# Patient Record
Sex: MALE | Race: AMERICAN INDIAN OR ALASKA NATIVE | NOT HISPANIC OR LATINO
[De-identification: names, ages, dates, MRNs, and addresses within clinical notes are randomized per-mention and may not be internally consistent; named-entity substitution may affect disease eponyms.]

---

## 2017-04-20 PROBLEM — Z00.00 ENCOUNTER FOR PREVENTIVE HEALTH EXAMINATION: Status: ACTIVE | Noted: 2017-04-20

## 2017-05-03 ENCOUNTER — APPOINTMENT (OUTPATIENT)
Dept: BREAST CENTER | Facility: CLINIC | Age: 77
End: 2017-05-03

## 2017-06-30 ENCOUNTER — OUTPATIENT (OUTPATIENT)
Dept: OUTPATIENT SERVICES | Facility: HOSPITAL | Age: 77
LOS: 1 days | Discharge: HOME | End: 2017-06-30

## 2017-06-30 DIAGNOSIS — R91.1 SOLITARY PULMONARY NODULE: ICD-10-CM

## 2017-11-16 ENCOUNTER — EMERGENCY (EMERGENCY)
Facility: HOSPITAL | Age: 77
LOS: 0 days | Discharge: HOME | End: 2017-11-16
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
## 2017-11-16 DIAGNOSIS — S09.90XA UNSPECIFIED INJURY OF HEAD, INITIAL ENCOUNTER: ICD-10-CM
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
## 2017-11-16 DIAGNOSIS — Z90.49 ACQUIRED ABSENCE OF OTHER SPECIFIED PARTS OF DIGESTIVE TRACT: ICD-10-CM

## 2017-11-16 DIAGNOSIS — Z79.899 OTHER LONG TERM (CURRENT) DRUG THERAPY: ICD-10-CM
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
## 2017-11-16 DIAGNOSIS — S00.81XA ABRASION OF OTHER PART OF HEAD, INITIAL ENCOUNTER: ICD-10-CM

## 2017-11-16 DIAGNOSIS — I10 ESSENTIAL (PRIMARY) HYPERTENSION: ICD-10-CM

## 2017-11-16 DIAGNOSIS — Z79.82 LONG TERM (CURRENT) USE OF ASPIRIN: ICD-10-CM

## 2017-11-16 DIAGNOSIS — S00.83XA CONTUSION OF OTHER PART OF HEAD, INITIAL ENCOUNTER: ICD-10-CM

## 2017-11-16 DIAGNOSIS — E78.5 HYPERLIPIDEMIA, UNSPECIFIED: ICD-10-CM

## 2018-12-24 ENCOUNTER — TRANSCRIPTION ENCOUNTER (OUTPATIENT)
Age: 78
End: 2018-12-24

## 2023-08-21 ENCOUNTER — EMERGENCY (EMERGENCY)
Facility: HOSPITAL | Age: 83
LOS: 0 days | Discharge: HOME | End: 2023-08-21
Attending: EMERGENCY MEDICINE
Payer: MEDICARE

## 2023-08-21 VITALS
WEIGHT: 171.96 LBS | HEART RATE: 72 BPM | DIASTOLIC BLOOD PRESSURE: 92 MMHG | TEMPERATURE: 98 F | RESPIRATION RATE: 16 BRPM | HEIGHT: 67 IN | OXYGEN SATURATION: 100 % | SYSTOLIC BLOOD PRESSURE: 189 MMHG

## 2023-08-21 DIAGNOSIS — Z79.01 LONG TERM (CURRENT) USE OF ANTICOAGULANTS: ICD-10-CM

## 2023-08-21 DIAGNOSIS — I48.91 UNSPECIFIED ATRIAL FIBRILLATION: ICD-10-CM

## 2023-08-21 DIAGNOSIS — M54.50 LOW BACK PAIN, UNSPECIFIED: ICD-10-CM

## 2023-08-21 DIAGNOSIS — R91.1 SOLITARY PULMONARY NODULE: ICD-10-CM

## 2023-08-21 DIAGNOSIS — S32.020A WEDGE COMPRESSION FRACTURE OF SECOND LUMBAR VERTEBRA, INITIAL ENCOUNTER FOR CLOSED FRACTURE: ICD-10-CM
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
## 2023-08-21 DIAGNOSIS — E78.5 HYPERLIPIDEMIA, UNSPECIFIED: ICD-10-CM
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
## 2023-08-21 DIAGNOSIS — J32.9 CHRONIC SINUSITIS, UNSPECIFIED: ICD-10-CM

## 2023-08-21 LAB
ALBUMIN SERPL ELPH-MCNC: 4.1 G/DL — SIGNIFICANT CHANGE UP (ref 3.5–5.2)
ALP SERPL-CCNC: 99 U/L — SIGNIFICANT CHANGE UP (ref 30–115)
ALT FLD-CCNC: 22 U/L — SIGNIFICANT CHANGE UP (ref 0–41)
ANION GAP SERPL CALC-SCNC: 10 MMOL/L — SIGNIFICANT CHANGE UP (ref 7–14)
ANISOCYTOSIS BLD QL: SLIGHT — SIGNIFICANT CHANGE UP
AST SERPL-CCNC: 22 U/L — SIGNIFICANT CHANGE UP (ref 0–41)
BASOPHILS # BLD AUTO: 0.05 K/UL — SIGNIFICANT CHANGE UP (ref 0–0.2)
BASOPHILS NFR BLD AUTO: 0.9 % — SIGNIFICANT CHANGE UP (ref 0–1)
BILIRUB SERPL-MCNC: 0.5 MG/DL — SIGNIFICANT CHANGE UP (ref 0.2–1.2)
BUN SERPL-MCNC: 21 MG/DL — HIGH (ref 10–20)
CALCIUM SERPL-MCNC: 9 MG/DL — SIGNIFICANT CHANGE UP (ref 8.4–10.4)
CHLORIDE SERPL-SCNC: 100 MMOL/L — SIGNIFICANT CHANGE UP (ref 98–110)
CO2 SERPL-SCNC: 24 MMOL/L — SIGNIFICANT CHANGE UP (ref 17–32)
CREAT SERPL-MCNC: 1.2 MG/DL — SIGNIFICANT CHANGE UP (ref 0.7–1.5)
EGFR: 60 ML/MIN/1.73M2 — SIGNIFICANT CHANGE UP
EOSINOPHIL # BLD AUTO: 0.37 K/UL — SIGNIFICANT CHANGE UP (ref 0–0.7)
EOSINOPHIL NFR BLD AUTO: 6.1 % — SIGNIFICANT CHANGE UP (ref 0–8)
GLUCOSE SERPL-MCNC: 109 MG/DL — HIGH (ref 70–99)
HCT VFR BLD CALC: 28.5 % — LOW (ref 42–52)
HGB BLD-MCNC: 10 G/DL — LOW (ref 14–18)
LYMPHOCYTES # BLD AUTO: 0.64 K/UL — LOW (ref 1.2–3.4)
LYMPHOCYTES # BLD AUTO: 10.5 % — LOW (ref 20.5–51.1)
MACROCYTES BLD QL: SIGNIFICANT CHANGE UP
MANUAL SMEAR VERIFICATION: SIGNIFICANT CHANGE UP
MCHC RBC-ENTMCNC: 35.1 G/DL — SIGNIFICANT CHANGE UP (ref 32–37)
MCHC RBC-ENTMCNC: 37.5 PG — HIGH (ref 27–31)
MCV RBC AUTO: 106.7 FL — HIGH (ref 80–94)
MONOCYTES # BLD AUTO: 0.64 K/UL — HIGH (ref 0.1–0.6)
MONOCYTES NFR BLD AUTO: 10.5 % — HIGH (ref 1.7–9.3)
NEUTROPHILS # BLD AUTO: 4.38 K/UL — SIGNIFICANT CHANGE UP (ref 1.4–6.5)
NEUTROPHILS NFR BLD AUTO: 71.1 % — SIGNIFICANT CHANGE UP (ref 42.2–75.2)
NEUTS BAND # BLD: 0.9 % — SIGNIFICANT CHANGE UP (ref 0–6)
PLAT MORPH BLD: NORMAL — SIGNIFICANT CHANGE UP
PLATELET # BLD AUTO: 277 K/UL — SIGNIFICANT CHANGE UP (ref 130–400)
PMV BLD: 10.1 FL — SIGNIFICANT CHANGE UP (ref 7.4–10.4)
POIKILOCYTOSIS BLD QL AUTO: SLIGHT — SIGNIFICANT CHANGE UP
POLYCHROMASIA BLD QL SMEAR: SLIGHT — SIGNIFICANT CHANGE UP
POTASSIUM SERPL-MCNC: 4.3 MMOL/L — SIGNIFICANT CHANGE UP (ref 3.5–5)
POTASSIUM SERPL-SCNC: 4.3 MMOL/L — SIGNIFICANT CHANGE UP (ref 3.5–5)
PROT SERPL-MCNC: 6.3 G/DL — SIGNIFICANT CHANGE UP (ref 6–8)
RBC # BLD: 2.67 M/UL — LOW (ref 4.7–6.1)
RBC # FLD: 13.7 % — SIGNIFICANT CHANGE UP (ref 11.5–14.5)
RBC BLD AUTO: ABNORMAL
SODIUM SERPL-SCNC: 134 MMOL/L — LOW (ref 135–146)
TROPONIN T SERPL-MCNC: <0.01 NG/ML — SIGNIFICANT CHANGE UP
WBC # BLD: 6.08 K/UL — SIGNIFICANT CHANGE UP (ref 4.8–10.8)
WBC # FLD AUTO: 6.08 K/UL — SIGNIFICANT CHANGE UP (ref 4.8–10.8)

## 2023-08-21 PROCEDURE — 70450 CT HEAD/BRAIN W/O DYE: CPT | Mod: 26,MA

## 2023-08-21 PROCEDURE — 72170 X-RAY EXAM OF PELVIS: CPT | Mod: 26

## 2023-08-21 PROCEDURE — 93005 ELECTROCARDIOGRAM TRACING: CPT

## 2023-08-21 PROCEDURE — 74177 CT ABD & PELVIS W/CONTRAST: CPT | Mod: MA

## 2023-08-21 PROCEDURE — 71045 X-RAY EXAM CHEST 1 VIEW: CPT

## 2023-08-21 PROCEDURE — 71260 CT THORAX DX C+: CPT | Mod: 26,MA

## 2023-08-21 PROCEDURE — 71260 CT THORAX DX C+: CPT | Mod: MA

## 2023-08-21 PROCEDURE — 71045 X-RAY EXAM CHEST 1 VIEW: CPT | Mod: 26

## 2023-08-21 PROCEDURE — 74177 CT ABD & PELVIS W/CONTRAST: CPT | Mod: 26,MA

## 2023-08-21 PROCEDURE — 99285 EMERGENCY DEPT VISIT HI MDM: CPT | Mod: 25

## 2023-08-21 PROCEDURE — 36415 COLL VENOUS BLD VENIPUNCTURE: CPT

## 2023-08-21 PROCEDURE — 70450 CT HEAD/BRAIN W/O DYE: CPT | Mod: MA

## 2023-08-21 PROCEDURE — 80053 COMPREHEN METABOLIC PANEL: CPT

## 2023-08-21 PROCEDURE — 85025 COMPLETE CBC W/AUTO DIFF WBC: CPT

## 2023-08-21 PROCEDURE — 84484 ASSAY OF TROPONIN QUANT: CPT

## 2023-08-21 PROCEDURE — 93010 ELECTROCARDIOGRAM REPORT: CPT

## 2023-08-21 PROCEDURE — 72170 X-RAY EXAM OF PELVIS: CPT

## 2023-08-21 PROCEDURE — 99285 EMERGENCY DEPT VISIT HI MDM: CPT

## 2023-08-21 PROCEDURE — 72125 CT NECK SPINE W/O DYE: CPT | Mod: 26,MA

## 2023-08-21 PROCEDURE — 72125 CT NECK SPINE W/O DYE: CPT | Mod: MA

## 2023-08-21 RX ORDER — IBUPROFEN 200 MG
1 TABLET ORAL
Qty: 28 | Refills: 0
Start: 2023-08-21 | End: 2023-08-27

## 2023-08-21 RX ORDER — LIDOCAINE 4 G/100G
1 CREAM TOPICAL ONCE
Refills: 0 | Status: COMPLETED | OUTPATIENT
Start: 2023-08-21 | End: 2023-08-21

## 2023-08-21 RX ORDER — DEXAMETHASONE 0.5 MG/5ML
6 ELIXIR ORAL ONCE
Refills: 0 | Status: COMPLETED | OUTPATIENT
Start: 2023-08-21 | End: 2023-08-21

## 2023-08-21 RX ORDER — LIDOCAINE 4 G/100G
1 CREAM TOPICAL
Qty: 7 | Refills: 0
Start: 2023-08-21 | End: 2023-08-27

## 2023-08-21 RX ORDER — KETOROLAC TROMETHAMINE 30 MG/ML
15 SYRINGE (ML) INJECTION ONCE
Refills: 0 | Status: DISCONTINUED | OUTPATIENT
Start: 2023-08-21 | End: 2023-08-21

## 2023-08-21 RX ADMIN — Medication 6 MILLIGRAM(S): at 18:06

## 2023-08-21 RX ADMIN — LIDOCAINE 1 PATCH: 4 CREAM TOPICAL at 18:06

## 2023-08-21 RX ADMIN — Medication 15 MILLIGRAM(S): at 18:06

## 2023-08-21 NOTE — ED PROVIDER NOTE - CARE PROVIDER_API CALL
Laura Henderson  Neurosurgery  [address], [address]  [place] [address]  Phone: [phone]  Fax: [phone]  Follow Up Time: 7-10 Days    Kalia Gonzalez  Otolaryngology  [address] [place] [address]  Phone: [phone]  Fax: [phone]  Follow Up Time: 7-10 Days

## 2023-08-21 NOTE — ED PROVIDER NOTE - PHYSICAL EXAMINATION
VITAL SIGNS: AFebrile, vital signs stable  CONSTITUTIONAL: Well-developed; well-nourished; in no acute distress.  SKIN: Skin exam is warm and dry, no acute rash.  HEAD: Normocephalic; atraumatic.  EYES: Pupils equal round reactive to light, Extraocular movements intact; conjunctiva and sclera clear.  ENT: No nasal discharge; airway clear. Moist mucus membranes.  NECK: Supple; non tender. No rigidity  CARD: Regular rate and rhythm. Normal S1, S2; no murmurs, gallops, or rubs.  RESP: Lungs clear to auscultation bilaterally. No wheezes, rales or rhonchi.  ABD: Abdomen soft; non-tender; non-distended;  no hepatosplenomegaly. No costovertebral angle tenderness.   EXT: Normal ROM. No clubbing, cyanosis or edema. No calf tenderness to palpation.  NEURO: Alert and Oriented x 3, Cranial nerves 2-12 intact, No nystagmus.  5/5 motor x 4 extremities, Sensation intact to light touch x 4 extremities, No facial droop or slurred speech. No pronator drift.  Normal rapid alternating movement and finger nose finger bilaterally. No midline cervical/thoracic/lumbar tenderness to palpation or step off. Normal gait, No ataxia. Left lumbar back ttp and spasm, no saddle anesthesia, EHL intact bilaterally  PSYCH: Cooperative, appropriate.

## 2023-08-21 NOTE — ED PROVIDER NOTE - CARE PROVIDERS DIRECT ADDRESSES
[email or web address]

## 2023-08-21 NOTE — ED PROVIDER NOTE - NSFOLLOWUPINSTRUCTIONS_ED_ALL_ED_FT
Our Emergency Department Referral Coordinators will be reaching out to you in the next 24-48 hours from 9:00am to 5:00pm with a follow up appointment. Please expect a phone call from the hospital in that time frame. If you do not receive a call or if you have any questions or concerns, you can reach them at   [phone]     Vertebral Compression Fracture    AMBULATORY CARE:    A vertebral compression fracture (VCF) is a collapse or breakdown in a bone in your spine. Compression fractures happen when there is too much pressure on the vertebra. VCFs most often occur in the thoracic (middle) and lumbar (lower) areas of your spine. Fractures may be mild to severe.       Signs and symptoms: You may not have any signs or symptoms with a mild VCF. You may have any of the following with a more severe fracture:  •Sudden, severe, and sharp back pain  •Back pain that gets worse when you stand or walk  •Muscle spasms in your back  •Problems urinating or having bowel movements  •Sudden weakness in your arms or legs  Call your local emergency number (911 in the [place]) if:   •You feel lightheaded, short of breath, and have chest pain.  •You cough up blood.  •You suddenly cannot feel your legs.  •You suddenly have trouble moving your arms or legs.    Seek care immediately if:   •Your arm or leg feels warm, tender, and painful. It may look swollen and red.  •You have new problems urinating or having bowel movements.  •You have severe pain in your back after falling, bending forward, sneezing, or coughing strongly.    Call your doctor or orthopedist if:   •You cannot sleep or rest because of back pain.  •You have pain or swelling in your back that is getting worse, or does not go away.  •You have questions or concerns about your condition or care.    Treatment may include any of the following, depending on how severe the fracture is:   •Bed rest may be needed for a mild fracture.  •A back brace may be needed for 8 to 12 weeks. A brace may decrease your pain, and help your vertebrae heal.  •A cane or walker can help you keep your balance when you walk. This helps prevent a fall that could cause more injury.  •Medicines may be given for pain. Bisphosphonates and calcitonin are medicines to help your bones get stronger. They can decrease the pain of a VCF caused by osteoporosis, and decrease your risk for another fracture.  •Physical or occupational therapy may be recommended. A physical therapist teaches you exercises to help improve movement and strength, and to decrease pain. An occupational therapist teaches you skills to help with your daily activities.  •Surgery may be needed if your pain, weakness, or numbness does not go away with other treatment. Surgery may make your spine more stable, and help decrease pressure on your spinal nerves caused by the fracture.    Heat and ice:   •Apply ice on your back for 15 to 20 minutes every hour or as directed. Use an ice pack, or put crushed ice in a plastic bag. Cover it with a towel before you apply it. Ice helps prevent tissue damage and decreases swelling and pain.  •Apply heat on your back for 20 to 30 minutes every 2 hours for as many days as directed. Heat helps decrease pain and muscle spasms.      Activity:   •Avoid activities that may make the pain worse, such as picking up heavy objects. When the pain decreases, begin normal, slow movements as directed by your healthcare provider. Your healthcare provider may have you do weight-bearing exercises such as walking. You may also do non-weight-bearing exercises such as swimming and bicycling.  •You may need to use a walker or cane. Ask your healthcare provider for more information about how to use a cane or a walker.  •When you  objects, bend at the hips and knees. Never bend from the waist only. Use bent knees and your leg muscles as you lift the object. While you lift the object, keep it close to your chest. Try not to twist or lift anything above your waist.    Physical and occupational therapy: Your healthcare provider may recommend you start or continue one or both types of therapy. A physical therapist teaches you exercises to help improve movement and strength, and to decrease pain. An occupational therapist teaches you skills to help with your daily activities.    Manage pain during sleep:   •Do not sleep on a waterbed. Waterbeds do not provide good back support.  •Sleep on a firm mattress. You may also put a ½ to 1-inch piece of plywood between the mattress and box spring.  •Sleep on your back with a pillow under your knees. This will decrease pressure on your back. You may also sleep on your side with 1 or both of your knees bent and a pillow between them. It may also be helpful to sleep on your stomach with a pillow under you at waist level.    Follow up with your doctor or orthopedist as directed: You may need to return for x-rays or other tests. Write down your questions so you remember to ask them during your visits.    Lung Masses and Growths    Lung cancer is a mass or growth in the lung made up of cancer cells, but not all masses in the lung are caused by cancer. There are many different types of growths that can form in the lungs, and how they’re diagnosed and treated depends on what symptoms you’re having; where the growths are in the lung; and your age, general health, and risk factors for developing lung cancer.     Lung masses and growths generally fall under two categories:    Benign growths (non-cancerous)  Malignant growths (cancerous)    What are benign lung nodules?  A nodule is a spot that shows up on an x-ray or CT scan of your lung. Nodules aren’t all that uncommon. In fact, they are seen on about one in every 500 chest x-rays. Nodules look like small, round growths of tissue surrounded by normal tissue. You could have one nodule or many nodules.     Your lung nodule is more likely to be benign (non-cancerous) if:    You are under age 40  You don’t smoke   The nodule is small  The nodule contains calcium  If doctors notice a nodule on an x-ray or CT scan of your lung, they will need to determine whether it’s a benign tumor or cancer. This is important, because early diagnosis and treatment of cancer can improve your chances of survival.     Benign lung nodules differ from malignant nodules in that they:    Won’t spread to other parts of the body  Can grow slowly, stop growing, or shrink  Aren’t usually life threatening  May not need to be removed or otherwise treated    Malignant lung masses  In some cases, a nodule or mass in the lung is malignant (cancerous). If a nodule shows up on your x-ray or CT scan, there are certain characteristics your doctor will look for to determine whether it’s cancer. Malignant masses are often larger than benign nodules. Malignant masses are also more likely to show up in certain parts of your lung. If your doctor suspects your lung mass could be malignant, he or she may order diagnostic tests to be sure. These include tests like:    Needle biopsy, in which a doctor removes a small amount of the tumor with a needle and studies it    Bronchoscopy, in which a doctor inserts a thin tube with a camera on it through the windpipe, looks at the nodule, and takes a tissue sample    What causes malignant lung masses, and how are they treated?  Malignant masses are often caused by lung cancer, but they can also be caused by lymphoma or cancers that have spread from another organ. Types of lung cancer include:    Small-cell carcinoma  Non-small cell carcinoma  Squamous cell carcinoma  Adenocarcinoma  If a lung nodule is found to be cancerous, it may be removed through surgery. Common surgical procedures that treat lung cancer include:    Segmentectomy and wedge resection surgery, in which the surgeon removes a small part of the lung    Lobectomy, in which the surgeon removes one or more lobes of the lung  Pneumonectomy, in which the surgeon removes one entire lung  Additional treatment for cancerous lung nodules may include therapies, such as:    Chemotherapy, with drugs that destroy the cancer cells  Radiation therapy, with high-energy beams that kill cells or shrink the cancerous cells  Proton therapy, a pencil beam scanning technique that delivers a narrow proton beam to the tumor, killing the tumor precisely while keeping surrounding healthy tissue from being harmed    Sinusitis, Adult    Sinusitis is soreness and inflammation of your sinuses. Sinuses are hollow spaces in the bones around your face. Your sinuses are located:     Around your eyes.  In the middle of your forehead.  Behind your nose.  In your cheekbones.    Your sinuses and nasal passages are lined with a stringy fluid (mucus). Mucus normally drains out of your sinuses. When your nasal tissues become inflamed or swollen, the mucus can become trapped or blocked so air cannot flow through your sinuses. This allows bacteria, viruses, and funguses to grow, which leads to infection.    Sinusitis can develop quickly and last for 7?10 days (acute) or for more than 12 weeks (chronic). Sinusitis often develops after a cold.    CAUSES  This condition is caused by anything that creates swelling in the sinuses or stops mucus from draining, including:    Allergies.  Asthma.  Bacterial or viral infection.  Abnormally shaped bones between the nasal passages.  Nasal growths that contain mucus (nasal polyps).  Narrow sinus openings.  Pollutants, such as chemicals or irritants in the air.  A foreign object stuck in the nose.  A fungal infection. This is rare.     RISK FACTORS  The following factors may make you more likely to develop this condition:    Having allergies or asthma.  Having had a recent cold or respiratory tract infection.  Having structural deformities or blockages in your nose or sinuses.  Having a weak immune system.  Doing a lot of swimming or diving.  Overusing nasal sprays.  Smoking.    SYMPTOMS  The main symptoms of this condition are pain and a feeling of pressure around the affected sinuses. Other symptoms include:    Upper toothache.  Earache.  Headache.  Bad breath.  Decreased sense of smell and taste.  A cough that may get worse at night.  Fatigue.  Fever.  Thick drainage from your nose. The drainage is often green and it may contain pus (purulent).  Stuffy nose or congestion.  Postnasal drip. This is when extra mucus collects in the throat or back of the nose.  Swelling and warmth over the affected sinuses.  Sore throat.  Sensitivity to light.    DIAGNOSIS  This condition is diagnosed based on symptoms, a medical history, and a physical exam. To find out if your condition is acute or chronic, your health care provider may:    Look in your nose for signs of nasal polyps.  Tap over the affected sinus to check for signs of infection.  View the inside of your sinuses using an imaging device that has a light attached (endoscope).    If your health care provider suspects that you have chronic sinusitis, you may also:    Be tested for allergies.  Have a sample of mucus taken from your nose (nasal culture) and checked for bacteria.  Have a mucus sample examined to see if your sinusitis is related to an allergy.    If your sinusitis does not respond to treatment and it lasts longer than 8 weeks, you may have an MRI or CT scan to check your sinuses. These scans also help to determine how severe your infection is.    In rare cases, a bone biopsy may be done to rule out more serious types of fungal sinus disease.    TREATMENT  Treatment for sinusitis depends on the cause and whether your condition is chronic or acute. If a virus is causing your sinusitis, your symptoms will go away on their own within 10 days. You may be given medicines to relieve your symptoms, including:    Topical nasal decongestants. They shrink swollen nasal passages and let mucus drain from your sinuses.  Antihistamines. These drugs block inflammation that is triggered by allergies. This can help to ease swelling in your nose and sinuses.  Topical nasal corticosteroids. These are nasal sprays that ease inflammation and swelling in your nose and sinuses.  Nasal saline washes. These rinses can help to get rid of thick mucus in your nose.    If your condition is caused by bacteria, you will be given an antibiotic medicine. If your condition is caused by a fungus, you will be given an antifungal medicine.    Surgery may be needed to correct underlying conditions, such as narrow nasal passages. Surgery may also be needed to remove polyps.    HOME CARE INSTRUCTIONS  Medicines    Take, use, or apply over-the-counter and prescription medicines only as told by your health care provider. These may include nasal sprays.  If you were prescribed an antibiotic medicine, take it as told by your health care provider. Do not stop taking the antibiotic even if you start to feel better.    Hydrate and Humidify    Drink enough water to keep your urine clear or pale yellow. Staying hydrated will help to thin your mucus.  Use a cool mist humidifier to keep the humidity level in your home above 50%.  Inhale steam for 10–15 minutes, 3–4 times a day or as told by your health care provider. You can do this in the bathroom while a hot shower is running.  Limit your exposure to cool or dry air.    Rest    Rest as much as possible.  Sleep with your head raised (elevated).  Make sure to get enough sleep each night.    General Instructions    Apply a warm, moist washcloth to your face 3–4 times a day or as told by your health care provider. This will help with discomfort.  Wash your hands often with soap and water to reduce your exposure to viruses and other germs. If soap and water are not available, use hand .  Do not smoke. Avoid being around people who are smoking (secondhand smoke).  Keep all follow-up visits as told by your health care provider. This is important.    SEEK MEDICAL CARE IF:  You have a fever.  Your symptoms get worse.  Your symptoms do not improve within 10 days.    SEEK IMMEDIATE MEDICAL CARE IF:  You have a severe headache.  You have persistent vomiting.  You have pain or swelling around your face or eyes.  You have vision problems.  You develop confusion.  Your neck is stiff.  You have trouble breathing.    ADDITIONAL NOTES AND INSTRUCTIONS    Please follow up with your Primary MD in 24-48 hr.  Seek immediate medical care for any new/worsening signs or symptoms.

## 2023-08-21 NOTE — ED PROVIDER NOTE - NSPTACCESSSVCSAPPTDETAILS_ED_ALL_ED_FT
Needs follow up with Dr Henderson 1- 2 weeks for compression fracture. also needs PT 1-2 weeks. Also needs ENT for possible fungal sinusitis seen on CT 1-2 weeks

## 2023-08-21 NOTE — ED PROVIDER NOTE - TOBACCO USE
11/07/20 0100   Provider Notification   Provider Name/Title Dr. Cristina [id number]   Method of Notification Electronic Page   Request Evaluate-Remote   Notification Reason Other     Patient Type 2 diabetic, Levemir on hold, do you want me to give this dose?   Never smoker

## 2023-08-21 NOTE — ED PROVIDER NOTE - OBJECTIVE STATEMENT
82M PMG HTN afib on eliquis, HL p/w lower back pain s/p fall 1 week ago. pt lives at home w son. pt was taking cymbalta for neuropathic pain after shingles on L upper arm healed. states it made him dizzy and he passed out, witnessed by family. no chi. no ha, neck pain. pain is in lower back constant worse w movement, able to ambulate but better hunched over, worse standing straight. no cp, sob. no numbness, weakness, tingling. no bowel/bladder incontinence. no dysuria, freq, hematuria. no longer dizzy, stopped taking cymbalta. no fever, cough. no ams.

## 2023-08-21 NOTE — ED PROVIDER NOTE - CARE PLAN
Principal Discharge DX:	Fracture, lumbar vertebra, compression  Secondary Diagnosis:	Lung nodule  Secondary Diagnosis:	Sinusitis   1

## 2023-08-21 NOTE — ED PROVIDER NOTE - PROVIDER TOKENS
PROVIDER:[TOKEN:[[id number]],FOLLOWUP:[7-10 Days]],PROVIDER:[TOKEN:[[id number]],FOLLOWUP:[7-10 Days]]

## 2023-08-21 NOTE — ED PROVIDER NOTE - PATIENT PORTAL LINK FT
You can access the FollowMyHealth Patient Portal offered by [facility] by registering at the following website: [email or web address]. By joining [employer]’s FollowMyHealth portal, you will also be able to view your health information using other applications (apps) compatible with our system.

## 2023-08-21 NOTE — ED PROVIDER NOTE - CLINICAL SUMMARY MEDICAL DECISION MAKING FREE TEXT BOX
pt found to have L2 compression fx, nv intact, able to ambulate with cane, seen by NS rec dc home w abdominal binder and pain control, f/u dr nieto as outpt 1-2 weeks, PT as well. pt informed of lung nodule- states he knew about this already- will see pmd for this and get further work up. pt also informed of possible fungal sinusitis seen on CT, pt asymptomatic- will get ENT f/u for this. 1-2 weeks, strict return precautions provided. pt dc with daughter at bedside. lives w son.

## 2023-08-21 NOTE — ED ADULT TRIAGE NOTE - CHIEF COMPLAINT QUOTE
S/p mechanical fall 1 week ago. C/o left buttocks pan. Denies head injury. pt takes eliquis. GCS 15.

## 2023-08-21 NOTE — CONSULT NOTE ADULT - SUBJECTIVE AND OBJECTIVE BOX
Neurosurgery  Consult    Patient is a 82y old  Male who presents with a chief complaint of back pain    HPI:      PAST MEDICAL & SURGICAL HISTORY:      FAMILY HISTORY:      Social History: (-) x 3    Allergies    No Known Allergies    Intolerances        MEDICATIONS  (STANDING):    MEDICATIONS  (PRN):      Review of systems:    Constitutional: No fever, weight loss or fatigue    Eyes: No eye pain or discharge  ENMT:  No difficulty hearing; No sinus or throat pain  Neck: No pain or stiffness  Respiratory: No cough, wheezing, chills or hemoptysis  Cardiovascular: No chest pain, palpitations, shortness of breath, dyspnea on exertion  Gastrointestinal: No abdominal pain, nausea, vomiting or hematemesis; No diarrhea or constipation.   Genitourinary: No dysuria, frequency, hematuria or incontinence  Neurological: As per HPI  Skin: No rashes or lesions   Endocrine: No heat or cold intolerance; No hair loss  Musculoskeletal: No joint pain or swelling  Psychiatric: No depression, anxiety, mood swings  Heme/Lymph: No easy bruising or bleeding gums    Vital Signs Last 24 Hrs  T(C): 36.7 (21 Aug 2023 16:58), Max: 36.7 (21 Aug 2023 16:58)  T(F): 98.1 (21 Aug 2023 16:58), Max: 98.1 (21 Aug 2023 16:58)  HR: 72 (21 Aug 2023 16:58) (72 - 72)  BP: 189/92 (21 Aug 2023 16:58) (189/92 - 189/92)  BP(mean): --  RR: 16 (21 Aug 2023 16:58) (16 - 16)  SpO2: 100% (21 Aug 2023 16:58) (100% - 100%)    Parameters below as of 21 Aug 2023 16:58  Patient On (Oxygen Delivery Method): room air        Neurologic Examination:  General:  Appearance is consistent with chronologic age.  No abnormal facies.   General: The patient is oriented to person, place, time and date.  Recent and remote memory intact.  Fund of knowledge is intact and normal.  Language with normal repetition, comprehension and naming.  Nondysarthric.    Cranial nerves: intact VA, VFF.  EOMI w/o nystagmus, skew or reported double vision.  PERRL.  No ptosis/weakness of eyelid closure.  Facial sensation is normal with normal bite.  No facial asymmetry.  Hearing grossly intact b/l.  Palate elevates midline.  Tongue midline.  Motor examination:   Normal tone, bulk and range of motion.  No tenderness, twitching, tremors or involuntary movements.  Formal Muscle Strength Testing: (MRC grade R/L) 5/5 UE; 5/5 LE.  No observable drift.  Sensory examination:   Intact to light touch and pinprick, in all extremities.  Cerebellum:   FTN/HKS intact with normal JOSE in all limbs.  No dysmetria or dysdiadokinesia.  Gait narrow based and normal.    Labs:   CBC Full  -  ( 21 Aug 2023 18:11 )  WBC Count : 6.08 K/uL  RBC Count : 2.67 M/uL  Hemoglobin : 10.0 g/dL  Hematocrit : 28.5 %  Platelet Count - Automated : 277 K/uL  Mean Cell Volume : 106.7 fL  Mean Cell Hemoglobin : 37.5 pg  Mean Cell Hemoglobin Concentration : 35.1 g/dL  Auto Neutrophil # : 4.38 K/uL  Auto Lymphocyte # : 0.64 K/uL  Auto Monocyte # : 0.64 K/uL  Auto Eosinophil # : 0.37 K/uL  Auto Basophil # : 0.05 K/uL  Auto Neutrophil % : 71.1 %  Auto Lymphocyte % : 10.5 %  Auto Monocyte % : 10.5 %  Auto Eosinophil % : 6.1 %  Auto Basophil % : 0.9 %    08-21    134<L>  |  100  |  21<H>  ----------------------------<  109<H>  4.3   |  24  |  1.2    Ca    9.0      21 Aug 2023 18:11    TPro  6.3  /  Alb  4.1  /  TBili  0.5  /  DBili  x   /  AST  22  /  ALT  22  /  AlkPhos  99  08-21    LIVER FUNCTIONS - ( 21 Aug 2023 18:11 )  Alb: 4.1 g/dL / Pro: 6.3 g/dL / ALK PHOS: 99 U/L / ALT: 22 U/L / AST: 22 U/L / GGT: x             Urinalysis Basic - ( 21 Aug 2023 18:11 )    Color: x / Appearance: x / SG: x / pH: x  Gluc: 109 mg/dL / Ketone: x  / Bili: x / Urobili: x   Blood: x / Protein: x / Nitrite: x   Leuk Esterase: x / RBC: x / WBC x   Sq Epi: x / Non Sq Epi: x / Bacteria: x          Neuroimaging:  NCHCT: CT Head No Cont:   ACC: [id number] EXAM:  CT CERVICAL SPINE   ORDERED BY: NABEEL FRANCO     ACC: [id number] EXAM:  CT BRAIN   ORDERED BY: NABEEL FRANCO     PROCEDURE DATE:  08/21/2023          INTERPRETATION:  CLINICAL INDICATIONS:  Status post fall.    TECHNIQUE:  Noncontrast head and cervical spine CT was performed.    Multiple contiguous axial images were obtained from the skull base to the   vertex without the use of intravenous contrast. Reformatted coronal and   sagittal images were were subsequently obtained and reviewed.    Axial images were obtained through the cervical spine using multislice   helical technique.  Reformatted coronal and sagittal images were were   subsequently obtained and reviewed.    COMPARISON: CT head dated 11/16/2017.    FINDINGS:    CT BRAIN:    There is no evidence for acute intracranial hemorrhage, mass effect or   midline shift.    The basal cisterns are patent. There is no hydrocephalus.    There is no extra-axial collection.    The visualized orbits are unremarkable.    The calvarium is intact, without depressed fracture.    There is opacification of the right sphenoid sinus with associated   internal hyperdensity secretions. The remaining paranasal sinuses and   tympanomastoid cavities are unremarkable.    CT CERVICAL SPINE:    There is no prevertebral soft tissue swelling. There is no splaying of   the spinous processes. The occipital condyles are normal. Lateral masses   of C1 align normally with C2. No lucent fracture line is identified.   There is no spondylolisthesis.    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 Findings include marginal osteophytes, uncovertebral spurring,   loss of normal disc space height, endplate sclerosis, and facet joint   arthrosis.    The spinal canal contents are suboptimally evaluated inherent to CT   technique though grossly unremarkable.    Partially visualized groundglass mass at the left lung apex, please see   dedicated concurrent chest CT report for further discussion.    Miscellaneous:  None.    IMPRESSION:    CT HEAD:  No acute intracranial pathology or hemorrhage. No acute calvarial   fracture.    Chronic right sphenoid sinusitis with internal hyperdensity potentially   reflecting fungal infiltration.    CT CERVICAL SPINE:  No acute fracture or subluxation.  Partially visualized groundglass mass at the left lung apex, please see   dedicated concurrent chest CT report for further discussion      < from: CT Chest w/ IV Cont (08.21.23 @ 19:21) >  BONES/SOFT TISSUES: Acute moderate compression deformity of the L2   vertebral body superior endplate. Degenerative changes of the spine.    OTHER: Scattered atherosclerotic vascular calcification.      IMPRESSION:    1. Acute moderate compression deformity of the L2 vertebral body superior   endplate.  2. Otherwise, no definite evidence of acute traumatic injury within the   chest, abdomen or pelvis.    < end of copied text >    --- End of Report ---      BRIAN HOFFMAN MD; Attending Radiologist  This document has been electronically signed. Aug 21 2023  8:16PM (08-21-23 @ 19:20)        Assessment:  This is a 82y Male with h/o afib on eliquis s/p fall last week now with L2 moderate compression fracture.    Plan:  No surgical Intervention           Recommend Pain management           Abdominal Binder when OOB -> for comfort           PT/OT eval - FWB           Follow up as outpatient 3-4 wks with Dr Henderson             -   08-21-23 @ 20:50           Neurosurgery  Consult    Patient is a 82y old  Male who presents with a chief complaint of back pain    HPI: This 81 y/o M with hx of afib on eliquis presents to ER c/o of mechanical back pain that started shortly after a fall that transpired 1 week ago. He denies radiculopathy but states pain is excaberbated with waliking. CT of chest abd pelvis reveal L2 compression fracture.      PAST MEDICAL & SURGICAL HISTORY:    afib on eliquis  FAMILY HISTORY:      Social History: (-) x 3    Allergies    No Known Allergies    Intolerances        MEDICATIONS  (STANDING):    MEDICATIONS  (PRN):      Review of systems:    Constitutional: No fever, weight loss or fatigue    Eyes: No eye pain or discharge  ENMT:  No difficulty hearing; No sinus or throat pain  Neck: No pain or stiffness  Respiratory: No cough, wheezing, chills or hemoptysis  Cardiovascular: No chest pain, palpitations, shortness of breath, dyspnea on exertion  Gastrointestinal: No abdominal pain, nausea, vomiting or hematemesis; No diarrhea or constipation.   Genitourinary: No dysuria, frequency, hematuria or incontinence  Neurological: As per HPI  Skin: No rashes or lesions   Endocrine: No heat or cold intolerance; No hair loss  Musculoskeletal: No joint pain or swelling  Psychiatric: No depression, anxiety, mood swings  Heme/Lymph: No easy bruising or bleeding gums    Vital Signs Last 24 Hrs  T(C): 36.7 (21 Aug 2023 16:58), Max: 36.7 (21 Aug 2023 16:58)  T(F): 98.1 (21 Aug 2023 16:58), Max: 98.1 (21 Aug 2023 16:58)  HR: 72 (21 Aug 2023 16:58) (72 - 72)  BP: 189/92 (21 Aug 2023 16:58) (189/92 - 189/92)  BP(mean): --  RR: 16 (21 Aug 2023 16:58) (16 - 16)  SpO2: 100% (21 Aug 2023 16:58) (100% - 100%)    Parameters below as of 21 Aug 2023 16:58  Patient On (Oxygen Delivery Method): room air        Neurologic Examination:  General:  Appearance is consistent with chronologic age.  No abnormal facies.   General: The patient is oriented to person, place, time and date.  Recent and remote memory intact.  Fund of knowledge is intact and normal.  Language with normal repetition, comprehension and naming.  Nondysarthric.    Cranial nerves: intact VA, VFF.  EOMI w/o nystagmus, skew or reported double vision.  PERRL.  No ptosis/weakness of eyelid closure.  Facial sensation is normal with normal bite.  No facial asymmetry.  Hearing grossly intact b/l.  Palate elevates midline.  Tongue midline.  Motor examination:   Normal tone, bulk and range of motion.  No tenderness, twitching, tremors or involuntary movements.  Formal Muscle Strength Testing: (MRC grade R/L) 5/5 UE; 5/5 LE.  No observable drift.  Sensory examination:   Intact to light touch and pinprick, in all extremities.  Cerebellum:     Gait narrow based and normal.    Labs:   CBC Full  -  ( 21 Aug 2023 18:11 )  WBC Count : 6.08 K/uL  RBC Count : 2.67 M/uL  Hemoglobin : 10.0 g/dL  Hematocrit : 28.5 %  Platelet Count - Automated : 277 K/uL  Mean Cell Volume : 106.7 fL  Mean Cell Hemoglobin : 37.5 pg  Mean Cell Hemoglobin Concentration : 35.1 g/dL  Auto Neutrophil # : 4.38 K/uL  Auto Lymphocyte # : 0.64 K/uL  Auto Monocyte # : 0.64 K/uL  Auto Eosinophil # : 0.37 K/uL  Auto Basophil # : 0.05 K/uL  Auto Neutrophil % : 71.1 %  Auto Lymphocyte % : 10.5 %  Auto Monocyte % : 10.5 %  Auto Eosinophil % : 6.1 %  Auto Basophil % : 0.9 %    08-21    134<L>  |  100  |  21<H>  ----------------------------<  109<H>  4.3   |  24  |  1.2    Ca    9.0      21 Aug 2023 18:11    TPro  6.3  /  Alb  4.1  /  TBili  0.5  /  DBili  x   /  AST  22  /  ALT  22  /  AlkPhos  99  08-21    LIVER FUNCTIONS - ( 21 Aug 2023 18:11 )  Alb: 4.1 g/dL / Pro: 6.3 g/dL / ALK PHOS: 99 U/L / ALT: 22 U/L / AST: 22 U/L / GGT: x             Urinalysis Basic - ( 21 Aug 2023 18:11 )    Color: x / Appearance: x / SG: x / pH: x  Gluc: 109 mg/dL / Ketone: x  / Bili: x / Urobili: x   Blood: x / Protein: x / Nitrite: x   Leuk Esterase: x / RBC: x / WBC x   Sq Epi: x / Non Sq Epi: x / Bacteria: x          Neuroimaging:  NCHCT: CT Head No Cont:   ACC: [id number] EXAM:  CT CERVICAL SPINE   ORDERED BY: NABEEL FRANCO     ACC: [id number] EXAM:  CT BRAIN   ORDERED BY: NABEEL FRANCO     PROCEDURE DATE:  08/21/2023          INTERPRETATION:  CLINICAL INDICATIONS:  Status post fall.    TECHNIQUE:  Noncontrast head and cervical spine CT was performed.    Multiple contiguous axial images were obtained from the skull base to the   vertex without the use of intravenous contrast. Reformatted coronal and   sagittal images were were subsequently obtained and reviewed.    Axial images were obtained through the cervical spine using multislice   helical technique.  Reformatted coronal and sagittal images were were   subsequently obtained and reviewed.    COMPARISON: CT head dated 11/16/2017.    FINDINGS:    CT BRAIN:    There is no evidence for acute intracranial hemorrhage, mass effect or   midline shift.    The basal cisterns are patent. There is no hydrocephalus.    There is no extra-axial collection.    The visualized orbits are unremarkable.    The calvarium is intact, without depressed fracture.    There is opacification of the right sphenoid sinus with associated   internal hyperdensity secretions. The remaining paranasal sinuses and   tympanomastoid cavities are unremarkable.    CT CERVICAL SPINE:    There is no prevertebral soft tissue swelling. There is no splaying of   the spinous processes. The occipital condyles are normal. Lateral masses   of C1 align normally with C2. No lucent fracture line is identified.   There is no spondylolisthesis.    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 Findings include marginal osteophytes, uncovertebral spurring,   loss of normal disc space height, endplate sclerosis, and facet joint   arthrosis.    The spinal canal contents are suboptimally evaluated inherent to CT   technique though grossly unremarkable.    Partially visualized groundglass mass at the left lung apex, please see   dedicated concurrent chest CT report for further discussion.    Miscellaneous:  None.    IMPRESSION:    CT HEAD:  No acute intracranial pathology or hemorrhage. No acute calvarial   fracture.    Chronic right sphenoid sinusitis with internal hyperdensity potentially   reflecting fungal infiltration.    CT CERVICAL SPINE:  No acute fracture or subluxation.  Partially visualized groundglass mass at the left lung apex, please see   dedicated concurrent chest CT report for further discussion      < from: CT Chest w/ IV Cont (08.21.23 @ 19:21) >  BONES/SOFT TISSUES: Acute moderate compression deformity of the L2   vertebral body superior endplate. Degenerative changes of the spine.    OTHER: Scattered atherosclerotic vascular calcification.      IMPRESSION:    1. Acute moderate compression deformity of the L2 vertebral body superior   endplate.  2. Otherwise, no definite evidence of acute traumatic injury within the   chest, abdomen or pelvis.    < end of copied text >    --- End of Report ---      BRIAN HOFFMAN MD; Attending Radiologist  This document has been electronically signed. Aug 21 2023  8:16PM (08-21-23 @ 19:20)        Assessment:  This is a 82y Male with h/o afib on eliquis s/p fall last week now with L2 moderate compression fracture.    Plan:  No surgical Intervention           Recommend Pain management           Abdominal Binder when OOB -> for comfort           PT/OT eval - FWB           Follow up as outpatient 3-4 wks with Dr Henderson             -   08-21-23 @ 20:50

## 2023-09-12 ENCOUNTER — APPOINTMENT (OUTPATIENT)
Age: 83
End: 2023-09-12
Payer: MEDICARE

## 2023-09-12 VITALS — HEIGHT: 67 IN | WEIGHT: 174 LBS | BODY MASS INDEX: 27.31 KG/M2

## 2023-09-12 DIAGNOSIS — R09.81 NASAL CONGESTION: ICD-10-CM

## 2023-09-12 PROCEDURE — 31575 DIAGNOSTIC LARYNGOSCOPY: CPT

## 2023-09-12 PROCEDURE — 99203 OFFICE O/P NEW LOW 30 MIN: CPT | Mod: 25

## 2023-09-12 RX ORDER — FLUTICASONE PROPIONATE 50 UG/1
50 SPRAY, METERED NASAL DAILY
Qty: 1 | Refills: 3 | Status: ACTIVE | COMMUNITY
Start: 2023-09-12 | End: 1900-01-01

## 2023-09-13 ENCOUNTER — APPOINTMENT (OUTPATIENT)
Dept: NEUROSURGERY | Facility: CLINIC | Age: 83
End: 2023-09-13
Payer: MEDICARE

## 2023-09-13 VITALS — BODY MASS INDEX: 27.31 KG/M2 | HEIGHT: 67 IN | WEIGHT: 174 LBS

## 2023-09-13 DIAGNOSIS — Z87.891 PERSONAL HISTORY OF NICOTINE DEPENDENCE: ICD-10-CM

## 2023-09-13 DIAGNOSIS — Z86.79 PERSONAL HISTORY OF OTHER DISEASES OF THE CIRCULATORY SYSTEM: ICD-10-CM

## 2023-09-13 DIAGNOSIS — Z85.46 PERSONAL HISTORY OF MALIGNANT NEOPLASM OF PROSTATE: ICD-10-CM

## 2023-09-13 DIAGNOSIS — Z86.39 PERSONAL HISTORY OF OTHER ENDOCRINE, NUTRITIONAL AND METABOLIC DISEASE: ICD-10-CM

## 2023-09-13 DIAGNOSIS — M19.90 UNSPECIFIED OSTEOARTHRITIS, UNSPECIFIED SITE: ICD-10-CM

## 2023-09-13 DIAGNOSIS — Z87.442 PERSONAL HISTORY OF URINARY CALCULI: ICD-10-CM

## 2023-09-13 DIAGNOSIS — Z82.3 FAMILY HISTORY OF STROKE: ICD-10-CM

## 2023-09-13 DIAGNOSIS — C61 MALIGNANT NEOPLASM OF PROSTATE: ICD-10-CM

## 2023-09-13 DIAGNOSIS — Z78.9 OTHER SPECIFIED HEALTH STATUS: ICD-10-CM

## 2023-09-13 DIAGNOSIS — Z87.09 PERSONAL HISTORY OF OTHER DISEASES OF THE RESPIRATORY SYSTEM: ICD-10-CM

## 2023-09-13 PROCEDURE — 99202 OFFICE O/P NEW SF 15 MIN: CPT

## 2023-09-13 RX ORDER — ATORVASTATIN CALCIUM 80 MG/1
TABLET, FILM COATED ORAL
Refills: 0 | Status: ACTIVE | COMMUNITY

## 2023-11-06 ENCOUNTER — APPOINTMENT (OUTPATIENT)
Dept: NEUROSURGERY | Facility: CLINIC | Age: 83
End: 2023-11-06
Payer: MEDICARE

## 2023-11-06 ENCOUNTER — TRANSCRIPTION ENCOUNTER (OUTPATIENT)
Age: 83
End: 2023-11-06

## 2023-11-06 VITALS — WEIGHT: 174 LBS | BODY MASS INDEX: 27.31 KG/M2 | HEIGHT: 67 IN

## 2023-11-06 DIAGNOSIS — S32.020S WEDGE COMPRESSION FRACTURE OF SECOND LUMBAR VERTEBRA, SEQUELA: ICD-10-CM

## 2023-11-06 DIAGNOSIS — M47.816 SPONDYLOSIS W/OUT MYELOPATHY OR RADICULOPATHY, LUMBAR REGION: ICD-10-CM

## 2023-11-06 PROCEDURE — 99214 OFFICE O/P EST MOD 30 MIN: CPT

## 2023-11-06 RX ORDER — APIXABAN 5 MG/1
5 TABLET, FILM COATED ORAL
Refills: 0 | Status: ACTIVE | COMMUNITY

## 2023-11-06 RX ORDER — ROSUVASTATIN CALCIUM 20 MG/1
20 TABLET, FILM COATED ORAL
Refills: 0 | Status: ACTIVE | COMMUNITY

## 2023-12-12 ENCOUNTER — APPOINTMENT (OUTPATIENT)
Dept: NEUROSURGERY | Facility: CLINIC | Age: 83
End: 2023-12-12